# Patient Record
Sex: MALE | Race: WHITE | Employment: FULL TIME | URBAN - METROPOLITAN AREA
[De-identification: names, ages, dates, MRNs, and addresses within clinical notes are randomized per-mention and may not be internally consistent; named-entity substitution may affect disease eponyms.]

---

## 2018-01-20 ENCOUNTER — HOSPITAL ENCOUNTER (EMERGENCY)
Facility: CLINIC | Age: 38
Discharge: HOME | End: 2018-01-20
Attending: NURSE PRACTITIONER | Admitting: NURSE PRACTITIONER
Payer: COMMERCIAL

## 2018-01-20 VITALS
WEIGHT: 300 LBS | SYSTOLIC BLOOD PRESSURE: 155 MMHG | DIASTOLIC BLOOD PRESSURE: 104 MMHG | HEART RATE: 86 BPM | RESPIRATION RATE: 16 BRPM | TEMPERATURE: 97.3 F | OXYGEN SATURATION: 97 %

## 2018-01-20 DIAGNOSIS — S61.012A THUMB LACERATION, LEFT, INITIAL ENCOUNTER: ICD-10-CM

## 2018-01-20 PROCEDURE — 90715 TDAP VACCINE 7 YRS/> IM: CPT | Performed by: NURSE PRACTITIONER

## 2018-01-20 PROCEDURE — 12002 RPR S/N/AX/GEN/TRNK2.6-7.5CM: CPT

## 2018-01-20 PROCEDURE — 90471 IMMUNIZATION ADMIN: CPT

## 2018-01-20 PROCEDURE — 99283 EMERGENCY DEPT VISIT LOW MDM: CPT | Mod: 25

## 2018-01-20 PROCEDURE — 25000128 H RX IP 250 OP 636: Performed by: NURSE PRACTITIONER

## 2018-01-20 RX ORDER — OXYCODONE AND ACETAMINOPHEN 5; 325 MG/1; MG/1
1-2 TABLET ORAL EVERY 6 HOURS PRN
Qty: 15 TABLET | Refills: 0 | Status: SHIPPED | OUTPATIENT
Start: 2018-01-20

## 2018-01-20 RX ORDER — LIDOCAINE HYDROCHLORIDE 10 MG/ML
INJECTION, SOLUTION INFILTRATION; PERINEURAL
Status: DISCONTINUED
Start: 2018-01-20 | End: 2018-01-20 | Stop reason: HOSPADM

## 2018-01-20 RX ADMIN — CLOSTRIDIUM TETANI TOXOID ANTIGEN (FORMALDEHYDE INACTIVATED), CORYNEBACTERIUM DIPHTHERIAE TOXOID ANTIGEN (FORMALDEHYDE INACTIVATED), BORDETELLA PERTUSSIS TOXOID ANTIGEN (GLUTARALDEHYDE INACTIVATED), BORDETELLA PERTUSSIS FILAMENTOUS HEMAGGLUTININ ANTIGEN (FORMALDEHYDE INACTIVATED), BORDETELLA PERTUSSIS PERTACTIN ANTIGEN, AND BORDETELLA PERTUSSIS FIMBRIAE 2/3 ANTIGEN 0.5 ML: 5; 2; 2.5; 5; 3; 5 INJECTION, SUSPENSION INTRAMUSCULAR at 19:45

## 2018-01-20 ASSESSMENT — ENCOUNTER SYMPTOMS: WOUND: 1

## 2018-01-20 NOTE — ED AVS SNAPSHOT
[facility] Emergency Department    [address] [place] [address]    Phone:  [phone]    Fax:  [phone]                                       Reynaldo Tobar   MRN: [id number]    Department:  [facility] Emergency Department   Date of Visit:  1/20/2018           Patient Information     Date Of Birth          1980        Your diagnoses for this visit were:     Thumb laceration, left, initial encounter        You were seen by Serena Barnett APRN CNP.      Follow-up Information     Follow up In 3 days.        Discharge Instructions          Sutures out in 10 days.     * LACERATION (All Closures)  A laceration is a cut through the skin. This will usually require stitches (sutures) or staples if it is deep. Minor cuts may be treated with a tape closure ( Steri-Strips ) or Dermabond skin glue.       HOME CARE:  PAIN MEDICINE: You may use acetaminophen (Tylenol) 650-1000 mg every 6 hours or ibuprofen (Motrin, Advil) 600 mg every 6-8 hours with food to control pain, if you are able to take these medicines. [NOTE: If you have chronic liver or kidney disease or ever had a stomach ulcer or GI bleeding, talk with your doctor before using these medicines.]  EXTREMITY, FACE or TRUNK WOUNDS:    Keep the wound clean and dry. If a bandage was applied and it becomes wet or dirty, replace it. Otherwise, leave it in place for the first 24 hours.    If stitches or staples were used, clean the wound daily. Protect the wound from sunlight and tanning lamps.    After removing the bandage, wash the area with soap and water. Use a wet cotton swab (Q tip) to loosen and remove any blood or crust that forms.    After cleaning, apply a thin layer of Polysporin or Bacitracin ointment. This will keep the wound clean and make it easier to remove the stitches or staples. Reapply a fresh bandage.    You may remove the bandage to shower as usual after the first 24 hours, but do not soak the  area in water (no swimming) until the stitches or staples are removed.    If Steri-Strips were used, keep the area clean and dry. If it becomes wet, blot it dry with a towel. It is okay to take a brief shower, but avoid scrubbing the area.    If Dermabond skin adhesive was used, do not scratch, rub or pick at the adhesive film. Do not place tape directly over the film. Do not apply liquid, ointment or creams to the wound while the film is in place. Do not clean the wound with peroxide and do not apply ointments. Avoid activities that cause heavy sweating until the film has fallen off. Protect the wound from prolonged exposure to sunlight or tanning lamps. You may shower as usual but do not soak the wound in water (no baths or swimming). The film will fall off by itself in 5-10 days.  SCALP WOUNDS: During the first two days, you may carefully rinse your hair in the shower to remove blood, glass or dirt particles. After two days, you may shower and shampoo your hair normally. Do not soak your scalp in the tub or go swimming until the stitches or staples have been removed.  MOUTH WOUNDS: Eat soft foods to reduce pain. If the cut is inside of your mouth, clean by rinsing after each meal and at bedtime with a mixture of equal parts water and Hydrogen Peroxide (do not swallow!). Or, you can use a cotton swab to directly apply Hydrogen Peroxide onto the cut.  After the wound is done healing, use sunscreen over the area whenever exposed for the next 6 minths to avoid a darker scar.     FOLLOW UP: Most skin wounds heal within ten days. Mouth and facial wounds heal within five days. However, even with proper treatment, a wound infection may sometimes occur. Therefore, you should check the wound daily for signs of infection listed below.  Stitches should be removed from the face within five days; stitches and staples should be removed from other parts of the body within 7-10 days. Unless you are told to come back to the  emergency room, you may have your doctor or urgent care remove the stitches. If dissolving stitches were used in the mouth, these will fall out or dissolve without the need for removal. If tape closures ( Steri-Strips ) were used, remove them yourself if they have not fallen off after 7 days. If Dermabond skin glue was used, the film will fall off by itself in 5-10 days.      GET PROMPT MEDICAL ATTENTION  if any of the following occur:    Increasing pain in the wound    Redness, swelling or pus coming from the wound    Fever over 101 F (38.3 C) oral    If stitches or staples come apart or fall out or if Steri-Strips fall off before seven days    If the wound edges re-open    Bleeding not controlled by direct pressure    6023-1282 [employer]. [address] [place], [place] [address]. All rights reserved. This information is not intended as a substitute for professional medical care. Always follow your healthcare professional's instructions.  This information has been modified by your health care provider with permission from the publisher.      24 Hour Appointment Hotline       To make an appointment at any [facility], call [phone] ([phone]). If you don't have a family doctor or clinic, we will help you find one. [facility] are conveniently located to serve the needs of you and your family.             Review of your medicines      START taking        Dose / Directions Last dose taken    oxyCODONE-acetaminophen 5-325 MG per tablet   Commonly known as:  PERCOCET   Dose:  1-2 tablet   Quantity:  15 tablet        Take 1-2 tablets by mouth every 6 hours as needed for moderate to severe pain   Refills:  0                Prescriptions were sent or printed at these locations (1 Prescription)                   Other Prescriptions                Printed at Department/Unit printer (1 of 1)         oxyCODONE-acetaminophen (PERCOCET) 5-325 MG per tablet                Orders Needing  Specimen Collection     None      Pending Results     No orders found from 1/18/2018 to 1/21/2018.            Pending Culture Results     No orders found from 1/18/2018 to 1/21/2018.            Pending Results Instructions     If you had any lab results that were not finalized at the time of your Discharge, you can call the ED Lab Result RN at [phone]. You will be contacted by this team for any positive Lab results or changes in treatment. The nurses are available 7 days a week from 10A to 6:30P.  You can leave a message 24 hours per day and they will return your call.        Test Results From Your Hospital Stay               Clinical Quality Measure: Blood Pressure Screening     Your blood pressure was checked while you were in the emergency department today. The last reading we obtained was  BP: (!) 155/104 . Please read the guidelines below about what these numbers mean and what you should do about them.  If your systolic blood pressure (the top number) is less than 120 and your diastolic blood pressure (the bottom number) is less than 80, then your blood pressure is normal. There is nothing more that you need to do about it.  If your systolic blood pressure (the top number) is 120-139 or your diastolic blood pressure (the bottom number) is 80-89, your blood pressure may be higher than it should be. You should have your blood pressure rechecked within a year by a primary care provider.  If your systolic blood pressure (the top number) is 140 or greater or your diastolic blood pressure (the bottom number) is 90 or greater, you may have high blood pressure. High blood pressure is treatable, but if left untreated over time it can put you at risk for heart attack, stroke, or kidney failure. You should have your blood pressure rechecked by a primary care provider within the next 4 weeks.  If your provider in the emergency department today gave you specific instructions to follow-up with your doctor or provider  "even sooner than that, you should follow that instruction and not wait for up to 4 weeks for your follow-up visit.        Thank you for choosing [facility]       Thank you for choosing [facility] for your care. Our goal is always to provide you with excellent care. Hearing back from our patients is one way we can continue to improve our services. Please take a few minutes to complete the written survey that you may receive in the mail after you visit with us. Thank you!        [facility] [employer] Information     [employer] lets you send messages to your doctor, view your test results, renew your prescriptions, schedule appointments and more. To sign up, go to [email or web address] . Click on \"Log in\" on the left side of the screen, which will take you to the Welcome page. Then click on \"Sign up Now\" on the right side of the page.     You will be asked to enter the access code listed below, as well as some personal information. Please follow the directions to create your username and password.     Your access code is: [email or web address]  Expires: 2018  7:49 PM     Your access code will  in 90 days. If you need help or a new code, please call your [facility] clinic or [phone].        Care EveryWhere ID     This is your Care EveryWhere ID. This could be used by other organizations to access your [facility] medical records  [id number]        Equal Access to Services     JI VICENTE : Hadii linda Ngo, waaxda auraadaha, qaybta kaalmada jodi, ted trevino. [facility] [phone].    ATENCIÓN: Si habla español, tiene a veliz disposición servicios gratuitos de asistencia lingüística. Eliecer al [phone].    We comply with applicable federal civil rights laws and [place] laws. We do not discriminate on the basis of race, color, national origin, age, disability, sex, sexual orientation, or gender identity.            After Visit Summary       This is your record. Keep this with you and " show to your community pharmacist(s) and doctor(s) at your next visit.

## 2018-01-20 NOTE — ED AVS SNAPSHOT
[facility] Emergency Department    [address]    [facility] [address]    Phone:  [phone]    Fax:  [phone]                                       Reynaldo Tobar   MRN: [id number]    Department:  [facility] Emergency Department   Date of Visit:  1/20/2018           After Visit Summary Signature Page     I have received my discharge instructions, and my questions have been answered. I have discussed any challenges I see with this plan with the nurse or doctor.    ..........................................................................................................................................  Patient/Patient Representative Signature      ..........................................................................................................................................  Patient Representative Print Name and Relationship to Patient    ..................................................               ................................................  Date                                            Time    ..........................................................................................................................................  Reviewed by Signature/Title    ...................................................              ..............................................  Date                                                            Time

## 2018-01-21 NOTE — ED PROVIDER NOTES
History     Chief Complaint:  Laceration    HPI   Reynaldo Tobar is a right handed 37 year old male who presents to the emergency department today for evaluation of laceration. The patient was cutting an onion and cut the thumb of his left hand with nail involvement. The patient reports the thumb was very sharp and reports his pain as 1-2/10. The patient is able to move his finger and it is mildly bleeding. He is not on blood thinners. The patient does not remember when his last tetanus shot was, but it was more than 10 years ago.     Allergies:  No Known Drug Allergies     Medications:    The patient is not currently taking any prescribed medications.    Past Medical History:    The patient denies any relevant past medical history.    Past Surgical History:    History reviewed. No pertinent past surgical history.    Family History:    History reviewed. No pertinent family history.     Social History:  The patient was accompanied to the ED by wife.  Smoking Status: Never  Smokeless Tobacco: Never  Alcohol Use: Yes   Marital Status:      Review of Systems   Skin: Positive for wound (left thumb).   All other systems reviewed and are negative.    Physical Exam     Patient Vitals for the past 24 hrs:   BP Temp Temp src Pulse Heart Rate Resp SpO2 Weight   01/20/18 1831 (!) 155/104 97.3  F (36.3  C) Oral 86 86 16 97 % 136.1 kg (300 lb)      Physical Exam  Eyes: Pupils equally round  HENT: Head is normal in appearance. Normal with moist mucus membranes.  Cardiovascular: Normal color of mucus membranes  Respiratory: Normal respiratory effort  Musculoskeletal: Left thumb laceration with normal flexion and extension. Laceration is through tip of nail bed but has normal sensation, normal radial and ulnar pulse. No asymmetry.  Skin: Normal, without rash.  Neurologic: Cranial nerves grossly intact, normal cognition, no apparent deficits.  Psychiatric: Normal affect.    Emergency Department Course   Procedures:      Laceration Repair      LACERATION:  A simple clean 3 cm laceration.    LOCATION:  Left thumb tip.    FUNCTION:  Distally sensation are intact.    ANESTHESIA:  Local using 6.5 total mL of Lidocaine 1%.    PREPARATION:  Irrigation and Scrubbing with Shur Clens.    DEBRIDEMENT:  wound explored, no foreign body found.    CLOSURE:  Wound was closed with One Layer. Skin closed with 7 x 4.0 Ethylon using interrupted sutures, 1 through the nail.    Emergency Department Course:  Nursing notes and vitals reviewed.  1836: I performed an exam of the patient as documented above.   1912: I performed a laceration repair.   1940: Findings and plan explained to the Patient and spouse. Patient discharged home with instructions regarding supportive care, medications, and reasons to return. The importance of close follow-up was reviewed.   I personally answered all related questions prior to discharge.     Impression & Plan    Medical Decision Making:  Reynaldo Tobar is a 37 year old male who presents to the ED for evaluation of a left thumb laceration. Laceration repaired. Tetanus status addressed this visit and tetanus update was given, no adverse reactions. No exposed tendon or tendon impairment has normal function. Discussed potential for infection including necrosis, nerve damage, infection/sepsis. Recommend have laceration rechecked in 2 days by PCP or return here if unable to obtain appointment. Instructed to watch for signs of infection including drainage, fever, swelling, pain. Should return right away. Appropriate dressing applied. Is stable to discharge home with after care instructions.     Instructed to have sutures removed in 10 days. Either by PCP or return here.          Diagnosis:    ICD-10-CM    1. Thumb laceration, left, initial encounter S61.012A        Disposition:  discharged to home    Discharge Medications:  New Prescriptions    OXYCODONE-ACETAMINOPHEN (PERCOCET) 5-325 MG PER TABLET    Take 1-2 tablets by mouth  every 6 hours as needed for moderate to severe pain       Scribe Disclosure:  I, Falguni Solis, am serving as a scribe at 6:45 PM on 1/20/2018 to document services personally performed by Serena Barnett, TAMEKA THOMPSON based on my observations and the provider's statements to me.    1/20/2018   [facility] [place] EMERGENCY DEPARTMENT       Serena Barnett, TAMEKA CNP  01/20/18 2100

## 2018-01-21 NOTE — ED NOTES
Pt provided with discharge paperwork and educated on recommended follow-up with PCP. Pt educated on how to manage symptoms at home along with sign/symptoms of infection. Pt voiced understanding and denied any questions at discharge.

## 2018-01-21 NOTE — ED NOTES
Pt arrives to the ED for laceration to left thumb. Pt was cutting with a  knife when he caught his finger. Mild bleeding noted to finger. Pt able to move finger. Not on any blood thinners.

## 2018-01-21 NOTE — DISCHARGE INSTRUCTIONS
Sutures out in 10 days.     * LACERATION (All Closures)  A laceration is a cut through the skin. This will usually require stitches (sutures) or staples if it is deep. Minor cuts may be treated with a tape closure ( Steri-Strips ) or Dermabond skin glue.       HOME CARE:  PAIN MEDICINE: You may use acetaminophen (Tylenol) 650-1000 mg every 6 hours or ibuprofen (Motrin, Advil) 600 mg every 6-8 hours with food to control pain, if you are able to take these medicines. [NOTE: If you have chronic liver or kidney disease or ever had a stomach ulcer or GI bleeding, talk with your doctor before using these medicines.]  EXTREMITY, FACE or TRUNK WOUNDS:    Keep the wound clean and dry. If a bandage was applied and it becomes wet or dirty, replace it. Otherwise, leave it in place for the first 24 hours.    If stitches or staples were used, clean the wound daily. Protect the wound from sunlight and tanning lamps.    After removing the bandage, wash the area with soap and water. Use a wet cotton swab (Q tip) to loosen and remove any blood or crust that forms.    After cleaning, apply a thin layer of Polysporin or Bacitracin ointment. This will keep the wound clean and make it easier to remove the stitches or staples. Reapply a fresh bandage.    You may remove the bandage to shower as usual after the first 24 hours, but do not soak the area in water (no swimming) until the stitches or staples are removed.    If Steri-Strips were used, keep the area clean and dry. If it becomes wet, blot it dry with a towel. It is okay to take a brief shower, but avoid scrubbing the area.    If Dermabond skin adhesive was used, do not scratch, rub or pick at the adhesive film. Do not place tape directly over the film. Do not apply liquid, ointment or creams to the wound while the film is in place. Do not clean the wound with peroxide and do not apply ointments. Avoid activities that cause heavy sweating until the film has fallen off. Protect  the wound from prolonged exposure to sunlight or tanning lamps. You may shower as usual but do not soak the wound in water (no baths or swimming). The film will fall off by itself in 5-10 days.  SCALP WOUNDS: During the first two days, you may carefully rinse your hair in the shower to remove blood, glass or dirt particles. After two days, you may shower and shampoo your hair normally. Do not soak your scalp in the tub or go swimming until the stitches or staples have been removed.  MOUTH WOUNDS: Eat soft foods to reduce pain. If the cut is inside of your mouth, clean by rinsing after each meal and at bedtime with a mixture of equal parts water and Hydrogen Peroxide (do not swallow!). Or, you can use a cotton swab to directly apply Hydrogen Peroxide onto the cut.  After the wound is done healing, use sunscreen over the area whenever exposed for the next 6 minths to avoid a darker scar.     FOLLOW UP: Most skin wounds heal within ten days. Mouth and facial wounds heal within five days. However, even with proper treatment, a wound infection may sometimes occur. Therefore, you should check the wound daily for signs of infection listed below.  Stitches should be removed from the face within five days; stitches and staples should be removed from other parts of the body within 7-10 days. Unless you are told to come back to the emergency room, you may have your doctor or urgent care remove the stitches. If dissolving stitches were used in the mouth, these will fall out or dissolve without the need for removal. If tape closures ( Steri-Strips ) were used, remove them yourself if they have not fallen off after 7 days. If Dermabond skin glue was used, the film will fall off by itself in 5-10 days.      GET PROMPT MEDICAL ATTENTION  if any of the following occur:    Increasing pain in the wound    Redness, swelling or pus coming from the wound    Fever over 101 F (38.3 C) oral    If stitches or staples come apart or fall out  or if Steri-Strips fall off before seven days    If the wound edges re-open    Bleeding not controlled by direct pressure    0712-4365 [employer]. [address] [place] [address], [place] [address]. All rights reserved. This information is not intended as a substitute for professional medical care. Always follow your healthcare professional's instructions.  This information has been modified by your health care provider with permission from the publisher.

## 2018-07-10 ENCOUNTER — THERAPY VISIT (OUTPATIENT)
Dept: PHYSICAL THERAPY | Facility: CLINIC | Age: 38
End: 2018-07-10
Payer: COMMERCIAL

## 2018-07-10 DIAGNOSIS — M54.59 MECHANICAL LOW BACK PAIN: ICD-10-CM

## 2018-07-10 PROCEDURE — 97112 NEUROMUSCULAR REEDUCATION: CPT | Mod: GP | Performed by: PHYSICAL THERAPIST

## 2018-07-10 PROCEDURE — 97161 PT EVAL LOW COMPLEX 20 MIN: CPT | Mod: GP | Performed by: PHYSICAL THERAPIST

## 2018-07-10 PROCEDURE — 97110 THERAPEUTIC EXERCISES: CPT | Mod: GP | Performed by: PHYSICAL THERAPIST

## 2018-07-10 NOTE — LETTER
"[facility]  [address]  [facility] [address]  [phone]    2018    Re: Reynaldo Tobar   :   1980  MRN:  [id number]   REFERRING PHYSICIAN:   Kiet LUNDBERG PT    Date of Initial Evaluation:  07/10/2018  Visits:  Rxs Used: 1  Reason for Referral:  Mechanical low back pain    EVALUATION SUMMARY    [facility] Initial Evaluation -- Lumbar  Date: July 10, 2018  Reynaldo Tobar is a 37 year old male with a lumbar condition.   Referral: Dr. 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     Symptoms (improving/unchanging/worsening):  improvig.     Symptoms commenced as a result of: no apparent reason   Condition occurred in the following environment:   n/a     Symptoms at onset (back/thigh/leg): back/buttocks/R LE  Constant symptoms (back/thigh/leg):   Intermittent symptoms (back/thigh/leg): back, buttocks, R LE    Symptoms are made worse with the following: Always Bending, Always Sitting, Always Rising, Time of day - Always AM and Other - reaching/twisting   Symptoms are made better with the following: Sometimes Walking, Sometimes Lying, Always On the move and Other - Advil    Disturbed sleep (yes/no):  yes Sleeping postures (prone/sup/side R/L): sides    Re: Reynaldo Tobar   :   1980    Previous episodes (0/1-5/6-10/11+): 2 Year of first episode:   Previous history: episodic  Previous treatments: " chiro, GIOVANNY most recetly    Specific Questions:  Cough/Sneeze/Strain (pos/neg): positive  Bowel/Bladder (normal/abnormal): normal  Gait (normal/abnormal): crooked/limping  Medications (nil/NSAIDS/analg/steroids/anticoag/other):  NSAIDS  Medical allergies:  none  General health (excellent/good/fair/poor):  good  Pertinent medical history:  None  Imaging (None/Xray/MRI/Other):  MRI,   Recent or major surgery (yes/no):  no  Night pain (yes/no): no  Accidents (yes/no): no  Unexplained weight loss (yes/no): no  Barriers at home: no  Other red flags: no    EXAMINATION    Posture:   Sitting (good/fair/poor): fair  Standing (good/fair/poor):fair  Lordosis (red/acc/normal): red  Correction of posture (better/worse/no effect): no effect    Lateral Shift (right/left/nil): nil  Relevant (yes/no):  ?no  Other Observations: none    Neurological:    Motor deficit:  n/a  Reflexes:  n/a  Sensory deficit:  n/a  Dural signs:  +R Slump    Movement Loss:   Sam Mod Min Nil Pain   Flexion  x x  pdm   Extension  x x  Erp, P R buttocks   Side Gliding R  x x     Side Gliding L  x x       Test Movements:   During: produces, abolishes, increases, decreases, no effect, centralizing, peripheralizing   After: better, worse, no better, no worse, no effect, centralized, peripheralized      Re: Reynaldo Tobar   :   1980    Pretest symptoms standing:    Symptoms During Symptoms After ROM increased ROM decreased No Effect   FIS        Rep FIS        EIS        Rep EIS        Pretest symptoms lying: R buttocks ,R LBP    Symptoms During Symptoms After ROM increased ROM decreased No Effect   KARI        Rep KARI        EIL Increases    No Worse         Rep EIL Decreases    Better  Centralized    x     If required, pretest symptoms:    Symptoms During Symptoms After ROM increased ROM decreased No Effect   SGIS - R        Rep SGIS - R        SGIS - L        Rep SGIS - L          Static Tests:  Sitting slouched:    Sitting erect:    Standing slouched    Standing erect:    Lying prone in extension:   Long sitting:      Other Tests:     Provisional Classification:  Derangement - Asymmetrical, unilateral, symptoms above knee    Principle of Management:  Education:  Posture, therapeutic dose of exercise, avoidance of flexion   Equipment provided:  Suggested firm  Mechanical therapy (Y/N):  Y   Extension principle:  Rep EIL + sag x 20/5 xday  Lateral Principle:    Flexion principle:    Other:                      Re: Reynaldo Tobar   :   1980    ASSESSMENT/PLAN:  Patient is a 37 year old male with lumbar complaints.    Patient has the following significant findings with corresponding treatment plan.                Diagnosis 1:  Mechanical low back pain  Pain -  manual therapy, self management, education, directional preference exercise and home program  Decreased ROM/flexibility - manual therapy and therapeutic exercise  Decreased function - therapeutic activities  Impaired posture - neuro re-education    Therapy Evaluation Codes:   1) History comprised of:   Personal factors that impact the plan of care:      None.    Comorbidity factors that impact the plan of care are:      None.     Medications impacting care: Anti-inflammatory.  2) Examination of Body Systems comprised of:   Body structures and functions that impact the plan of care:      Lumbar spine.   Activity limitations that impact the plan of care are:      Bending, Lifting, Sitting and Working.  3) Clinical presentation characteristics are:   Evolving/Changing.  4) Decision-Making    Low complexity using standardized patient assessment instrument and/or measureable assessment of functional outcome.  Cumulative Therapy Evaluation is: Low complexity.    Previous and current functional limitations:  (See Goal Flow Sheet for this information)    Short term and Long term goals: (See Goal Flow Sheet for this information)     Communication ability:  Patient appears to be able to clearly communicate and  understand verbal and written communication and follow directions correctly.  Treatment Explanation - The following has been discussed with the patient:   RX ordered/plan of care  Anticipated outcomes  Possible risks and side effects  This patient would benefit from PT intervention to resume normal activities.   Rehab potential is good.                        Re: Reynaldo Menjivareen   :   1980    Frequency:  1 X week, once daily  Duration:  for 6 weeks  Discharge Plan:  Achieve all LTG.  Independent in home treatment program.  Reach maximal therapeutic benefit.        Thank you for your referral.    INQUIRIES  Therapist: Mehnaz Martin, PT, Cert, MDT  [facility]  [id number] [place] [address]  Phone: [phone]  Fax: [phone]

## 2018-07-10 NOTE — MR AVS SNAPSHOT
"              After Visit Summary   7/10/2018    Reynaldo Tobar    MRN: [id number]           Patient Information     Date Of Birth          1980        Visit Information        Provider Department      7/10/2018 3:30 PM Mehnaz Martin, PT [facility]        Today's Diagnoses     Mechanical low back pain           Follow-ups after your visit        Your next 10 appointments already scheduled     Jul 25, 2018  5:10 PM CDT   JACQUIE Spine with Mehnaz Martin, PT   [facility] PT ([facility]ACQUIE [place]  )    [address] [facility]  [address] [place] [address]   [phone]            Jul 27, 2018  2:50 PM CDT   JACQUIE Spine with Mehnaz Martin, PT   [facility]QU[facility] PT (JACQUIE [place]  )    [address] [facility]  [address]  [facility] [address]   [phone]              Who to contact     If you have questions or need follow up information about today's clinic visit or your schedule please contact [facility] directly at [phone].  Normal or non-critical lab and imaging results will be communicated to you by NeoGuide Systemshart, letter or phone within 4 business days after the clinic has received the results. If you do not hear from us within 7 days, please contact the clinic through Apps4Prot or phone. If you have a critical or abnormal lab result, we will notify you by phone as soon as possible.  Submit refill requests through Pixspan or call your pharmacy and they will forward the refill request to us. Please allow 3 business days for your refill to be completed.          Additional Information About Your Visit        Pixspan Information     Pixspan lets you send messages to your doctor, view your test results, renew your prescriptions, schedule appointments and more. To sign up, go to [email or web address] . Click on \"Log in\" on the left side of the screen, which will take you to the Welcome page. Then click on \"Sign up Now\" on the right side of the page.     You will be asked to enter the access code listed " below, as well as some personal information. Please follow the directions to create your username and password.     Your access code is: [email or web address]  Expires: 10/8/2018  3:16 PM     Your access code will  in 90 days. If you need help or a new code, please call your [facility] clinic or [phone].        Care EveryWhere ID     This is your Care EveryWhere ID. This could be used by other organizations to access your [facility] medical records  [id number]         Blood Pressure from Last 3 Encounters:   18 (!) 155/104    Weight from Last 3 Encounters:   18 136.1 kg (300 lb)              We Performed the Following     HC PT EVAL, LOW COMPLEXITY     JACQUIE INITIAL EVAL REPORT     NEUROMUSCULAR RE-EDUCATION     THERAPEUTIC EXERCISES        Primary Care Provider Office Phone # Fax #    [facility] [phone] [address] [facility] [address]        Equal Access to Services     JI VICENTE : Hadii aad ku hadasho Soomaali, waaxda luqadaha, qaybta kaalmada adeegyada, waxay idiin hayaan sarah victoria . So [facility] [phone].    ATENCIÓN: Si habla español, tiene a veliz disposición servicios gratuitos de asistencia lingüística. Llame al [phone].    We comply with applicable federal civil rights laws and [place] laws. We do not discriminate on the basis of race, color, national origin, age, disability, sex, sexual orientation, or gender identity.            Thank you!     Thank you for choosing [facility]  for your care. Our goal is always to provide you with excellent care. Hearing back from our patients is one way we can continue to improve our services. Please take a few minutes to complete the written survey that you may receive in the mail after your visit with us. Thank you!             Your Updated Medication List - Protect others around you: Learn how to safely use, store and throw away your medicines at www.disposemymeds.org.           This list is accurate as of 7/10/18 11:59 PM.  Always use your most recent med list.                   Brand Name Dispense Instructions for use Diagnosis    oxyCODONE-acetaminophen 5-325 MG per tablet    PERCOCET    15 tablet    Take 1-2 tablets by mouth every 6 hours as needed for moderate to severe pain

## 2018-07-10 NOTE — PROGRESS NOTES
[facility] Initial Evaluation -- Lumbar    Date: July 10, 2018  Reynaldo Tobar is a 37 year old male with a *** condition.   Referral: ***  Work mechanical stresses:  ***  Employment status:  ***  Leisure mechanical stresses: ***  Functional disability score (JOSE/STarT Back):  ***  VAS score (0-10): ***  Patient goals/expectations:  ***    HISTORY:    Present symptoms: ***  Pain quality (sharp/shooting/stabbing/aching/burning/cramping):  ***   Paresthesia (yes/no):  ***    Present since (onset date): ***.     Symptoms (improving/unchanging/worsening):  ***.     Symptoms commenced as a result of: ***   Condition occurred in the following environment:   ***     Symptoms at onset (back/thigh/leg): ***  Constant symptoms (back/thigh/leg): ***  Intermittent symptoms (back/thigh/leg): ***    Symptoms are made worse with the following: {JACQUIE Lumbar Better/Worse:980827}   Symptoms are made better with the following: {JACQUIE Lumbar Better/Worse:489313}    Disturbed sleep (yes/no):  *** Sleeping postures (prone/sup/side R/L): ***    Previous episodes (0/1-5/6-10/11+): *** Year of first episode: ***    Previous history: ***  Previous treatments: ***      Specific Questions:  Cough/Sneeze/Strain (pos/neg): ***  Bowel/Bladder (normal/abnormal): ***  Gait (normal/abnormal): ***  Medications (nil/NSAIDS/analg/steroids/anticoag/other):  {JACQUIE Medications:885557}  Medical allergies:  ***  General health (excellent/good/fair/poor):  ***  Pertinent medical history:  {JACQUIE Past Medical History:700287}  Imaging (None/Xray/MRI/Other):  ***  Recent or major surgery (yes/no):  ***  Night pain (yes/no): ***  Accidents (yes/no): ***  Unexplained weight loss (yes/no): ***  Barriers at home: ***  Other red flags: ***    EXAMINATION    Posture:   Sitting (good/fair/poor): ***  Standing (good/fair/poor):***  Lordosis (red/acc/normal): ***  Correction of posture (better/worse/no effect): ***    Lateral Shift (right/left/nil):  "***  Relevant (yes/no):  ***  Other Observations: ***    Neurological:    Motor deficit:  ***  Reflexes:  ***  Sensory deficit:  ***  Dural signs:  ***    Movement Loss:   Sam Mod Min Nil Pain   Flexion     ***   Extension     ***   Side Gliding R     ***   Side Gliding L     ***     Test Movements:   During: produces, abolishes, increases, decreases, no effect, centralizing, peripheralizing   After: better, worse, no better, no worse, no effect, centralized, peripheralized    Pretest symptoms standing: ***   Symptoms During Symptoms After ROM increased ROM decreased No Effect   FIS {JACQUIE MDT During Testin::\" \"} {JACQUIE MDT After Testin::\" \"}      Rep FIS {JACQUIE MDT During Testin::\" \"} {JACQUIE MDT After Testin::\" \"}      EIS {JACQUIE MDT During Testin::\" \"} {JACQUIE MDT After Testin::\" \"}      Rep EIS {JACQUIE MDT During Testin::\" \"} {JACQUIE MDT After Testin::\" \"}      Pretest symptoms lying: ***    Symptoms During Symptoms After ROM increased ROM decreased No Effect   KARI {JACQUIE MDT During Testin::\" \"} {JACQUIE MDT After Testin::\" \"}      Rep KARI {JACQUIE MDT During Testin::\" \"} {JACQUIE MDT After Testin::\" \"}      EIL {JACQUIE MDT During Testin::\" \"} {JACQUIE MDT After Testin::\" \"}      Rep EIL {JACQUIE MDT During Testin::\" \"} {JACQUIE MDT After Testin::\" \"}      If required, pretest symptoms: ***   Symptoms During Symptoms After ROM increased ROM decreased No Effect   SGIS - R {JACQUIE MDT During Testin::\" \"} {JACQUIE MDT After Testin::\" \"}      Rep SGIS - R {JACQUIE MDT During Testin::\" \"} {JACQUIE MDT After Testin::\" \"}      SGIS - L {JACQUIE MDT During Testin::\" \"} {JACQUIE MDT After Testin::\" \"}      Rep SGIS - L {JACQUIE MDT During Testin::\" \"} {JACQUIE MDT After Testin::\" \"}        Static Tests:  Sitting slouched:  ***  Sitting erect:  ***  Standing slouched ***  Standing erect:  ***  Lying prone in " extension:  *** Long sitting:  ***    Other Tests: ***    Provisional Classification:  {taurus mdt lumbar classification:939873}    Principle of Management:  Education:  ***   Equipment provided:  ***  Mechanical therapy (Y/N):  ***   Extension principle:  ***  Lateral Principle:  ***  Flexion principle:  ***  Other:  ***    ASSESSMENT/PLAN:    {REHAB NOTES:297714}

## 2018-07-11 PROBLEM — M54.59 MECHANICAL LOW BACK PAIN: Status: ACTIVE | Noted: 2018-07-11

## 2018-07-11 NOTE — PROGRESS NOTES
"[facility] Initial Evaluation -- Lumbar    Date: July 10, 2018  Reynaldo Tobar is a 37 year old male with a lumbar condition.   Referral: Dr. 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     Symptoms (improving/unchanging/worsening):  improvig.     Symptoms commenced as a result of: no apparent reason   Condition occurred in the following environment:   n/a     Symptoms at onset (back/thigh/leg): back/buttocks/R LE  Constant symptoms (back/thigh/leg):   Intermittent symptoms (back/thigh/leg): back, buttocks, R LE    Symptoms are made worse with the following: Always Bending, Always Sitting, Always Rising, Time of day - Always AM and Other - reaching/twisting   Symptoms are made better with the following: Sometimes Walking, Sometimes Lying, Always On the move and Other - Advil    Disturbed sleep (yes/no):  yes Sleeping postures (prone/sup/side R/L): sides    Previous episodes (0/1-5/6-10/11+): 2 Year of first episode:     Previous history: episodic  Previous treatments: chiro, GIOVANNY most recetly      Specific Questions:  Cough/Sneeze/Strain (pos/neg): positive  Bowel/Bladder (normal/abnormal): normal  Gait (normal/abnormal): crooked/limping  Medications (nil/NSAIDS/analg/steroids/anticoag/other):  NSAIDS  Medical allergies:  none  General health (excellent/good/fair/poor):  good  Pertinent medical history:  None  Imaging (None/Xray/MRI/Other):  MRI,   Recent or major surgery " (yes/no):  no  Night pain (yes/no): no  Accidents (yes/no): no  Unexplained weight loss (yes/no): no  Barriers at home: no  Other red flags: no    EXAMINATION    Posture:   Sitting (good/fair/poor): fair  Standing (good/fair/poor):fair  Lordosis (red/acc/normal): red  Correction of posture (better/worse/no effect): no effect    Lateral Shift (right/left/nil): nil  Relevant (yes/no):  ?no  Other Observations: none    Neurological:    Motor deficit:  n/a  Reflexes:  n/a  Sensory deficit:  n/a  Dural signs:  +R Slump    Movement Loss:   Sam Mod Min Nil Pain   Flexion  x x  pdm   Extension  x x  Erp, P R buttocks   Side Gliding R  x x     Side Gliding L  x x       Test Movements:   During: produces, abolishes, increases, decreases, no effect, centralizing, peripheralizing   After: better, worse, no better, no worse, no effect, centralized, peripheralized    Pretest symptoms standing:    Symptoms During Symptoms After ROM increased ROM decreased No Effect   FIS        Rep FIS        EIS        Rep EIS        Pretest symptoms lying: R buttocks ,R LBP    Symptoms During Symptoms After ROM increased ROM decreased No Effect   KARI        Rep KARI        EIL Increases    No Worse         Rep EIL Decreases    Better  Centralized    x     If required, pretest symptoms:    Symptoms During Symptoms After ROM increased ROM decreased No Effect   SGIS - R        Rep SGIS - R        SGIS - L        Rep SGIS - L          Static Tests:  Sitting slouched:    Sitting erect:    Standing slouched   Standing erect:    Lying prone in extension:   Long sitting:      Other Tests:     Provisional Classification:  Derangement - Asymmetrical, unilateral, symptoms above knee    Principle of Management:  Education:  Posture, therapeutic dose of exercise, avoidance of flexion   Equipment provided:  Suggested firm  Mechanical therapy (Y/N):  Y   Extension principle:  Rep EIL + sag x 20/5 xday  Lateral Principle:    Flexion principle:    Other:       ASSESSMENT/PLAN:    Patient is a 37 year old male with lumbar complaints.    Patient has the following significant findings with corresponding treatment plan.                Diagnosis 1:  Mechanical low back pain  Pain -  manual therapy, self management, education, directional preference exercise and home program  Decreased ROM/flexibility - manual therapy and therapeutic exercise  Decreased function - therapeutic activities  Impaired posture - neuro re-education    Therapy Evaluation Codes:   1) History comprised of:   Personal factors that impact the plan of care:      None.    Comorbidity factors that impact the plan of care are:      None.     Medications impacting care: Anti-inflammatory.  2) Examination of Body Systems comprised of:   Body structures and functions that impact the plan of care:      Lumbar spine.   Activity limitations that impact the plan of care are:      Bending, Lifting, Sitting and Working.  3) Clinical presentation characteristics are:   Evolving/Changing.  4) Decision-Making    Low complexity using standardized patient assessment instrument and/or measureable assessment of functional outcome.  Cumulative Therapy Evaluation is: Low complexity.    Previous and current functional limitations:  (See Goal Flow Sheet for this information)    Short term and Long term goals: (See Goal Flow Sheet for this information)     Communication ability:  Patient appears to be able to clearly communicate and understand verbal and written communication and follow directions correctly.  Treatment Explanation - The following has been discussed with the patient:   RX ordered/plan of care  Anticipated outcomes  Possible risks and side effects  This patient would benefit from PT intervention to resume normal activities.   Rehab potential is good.    Frequency:  1 X week, once daily  Duration:  for 6 weeks  Discharge Plan:  Achieve all LTG.  Independent in home treatment program.  Reach maximal therapeutic  benefit.    Please refer to the daily flowsheet for treatment today, total treatment time and time spent performing 1:1 timed codes.

## 2018-07-25 ENCOUNTER — THERAPY VISIT (OUTPATIENT)
Dept: PHYSICAL THERAPY | Facility: CLINIC | Age: 38
End: 2018-07-25
Payer: COMMERCIAL

## 2018-07-25 DIAGNOSIS — M54.59 MECHANICAL LOW BACK PAIN: ICD-10-CM

## 2018-07-25 PROCEDURE — 97140 MANUAL THERAPY 1/> REGIONS: CPT | Mod: GP | Performed by: PHYSICAL THERAPIST

## 2018-07-25 PROCEDURE — 97112 NEUROMUSCULAR REEDUCATION: CPT | Mod: GP | Performed by: PHYSICAL THERAPIST

## 2018-07-25 PROCEDURE — 97110 THERAPEUTIC EXERCISES: CPT | Mod: GP | Performed by: PHYSICAL THERAPIST

## 2019-11-08 ENCOUNTER — HEALTH MAINTENANCE LETTER (OUTPATIENT)
Age: 39
End: 2019-11-08

## 2020-12-06 ENCOUNTER — HEALTH MAINTENANCE LETTER (OUTPATIENT)
Age: 40
End: 2020-12-06

## 2021-09-25 ENCOUNTER — HEALTH MAINTENANCE LETTER (OUTPATIENT)
Age: 41
End: 2021-09-25

## 2022-01-15 ENCOUNTER — HEALTH MAINTENANCE LETTER (OUTPATIENT)
Age: 42
End: 2022-01-15

## 2023-01-07 ENCOUNTER — HEALTH MAINTENANCE LETTER (OUTPATIENT)
Age: 43
End: 2023-01-07

## 2023-04-22 ENCOUNTER — HEALTH MAINTENANCE LETTER (OUTPATIENT)
Age: 43
End: 2023-04-22